# Patient Record
Sex: FEMALE | Race: BLACK OR AFRICAN AMERICAN | NOT HISPANIC OR LATINO | URBAN - METROPOLITAN AREA
[De-identification: names, ages, dates, MRNs, and addresses within clinical notes are randomized per-mention and may not be internally consistent; named-entity substitution may affect disease eponyms.]

---

## 2020-01-22 ENCOUNTER — INPATIENT (INPATIENT)
Facility: HOSPITAL | Age: 40
LOS: 5 days | Discharge: HOME | DRG: 885 | End: 2020-01-28
Attending: PSYCHIATRY & NEUROLOGY | Admitting: PSYCHIATRY & NEUROLOGY
Payer: SELF-PAY

## 2020-01-22 VITALS
HEART RATE: 89 BPM | TEMPERATURE: 98 F | WEIGHT: 130.07 LBS | OXYGEN SATURATION: 100 % | RESPIRATION RATE: 19 BRPM | HEIGHT: 62 IN | DIASTOLIC BLOOD PRESSURE: 71 MMHG | SYSTOLIC BLOOD PRESSURE: 109 MMHG

## 2020-01-22 DIAGNOSIS — Z72.89 OTHER PROBLEMS RELATED TO LIFESTYLE: ICD-10-CM

## 2020-01-22 DIAGNOSIS — F31.9 BIPOLAR DISORDER, UNSPECIFIED: ICD-10-CM

## 2020-01-22 LAB
ALBUMIN SERPL ELPH-MCNC: 3.8 G/DL — SIGNIFICANT CHANGE UP (ref 3.4–5)
ALP SERPL-CCNC: 48 U/L — SIGNIFICANT CHANGE UP (ref 40–120)
ALT FLD-CCNC: 25 U/L — SIGNIFICANT CHANGE UP (ref 12–42)
AMPHET UR-MCNC: NEGATIVE — SIGNIFICANT CHANGE UP
ANION GAP SERPL CALC-SCNC: 3 MMOL/L — LOW (ref 9–16)
AST SERPL-CCNC: 21 U/L — SIGNIFICANT CHANGE UP (ref 15–37)
BARBITURATES UR SCN-MCNC: NEGATIVE — SIGNIFICANT CHANGE UP
BENZODIAZ UR-MCNC: NEGATIVE — SIGNIFICANT CHANGE UP
BILIRUB SERPL-MCNC: 0.3 MG/DL — SIGNIFICANT CHANGE UP (ref 0.2–1.2)
BUN SERPL-MCNC: 13 MG/DL — SIGNIFICANT CHANGE UP (ref 7–23)
CALCIUM SERPL-MCNC: 9.1 MG/DL — SIGNIFICANT CHANGE UP (ref 8.5–10.5)
CHLORIDE SERPL-SCNC: 107 MMOL/L — SIGNIFICANT CHANGE UP (ref 96–108)
CO2 SERPL-SCNC: 33 MMOL/L — HIGH (ref 22–31)
COCAINE METAB.OTHER UR-MCNC: NEGATIVE — SIGNIFICANT CHANGE UP
CREAT SERPL-MCNC: 0.87 MG/DL — SIGNIFICANT CHANGE UP (ref 0.5–1.3)
ETHANOL SERPL-MCNC: 135 MG/DL — HIGH
GLUCOSE SERPL-MCNC: 91 MG/DL — SIGNIFICANT CHANGE UP (ref 70–99)
HCT VFR BLD CALC: 37.9 % — SIGNIFICANT CHANGE UP (ref 34.5–45)
HGB BLD-MCNC: 12.3 G/DL — SIGNIFICANT CHANGE UP (ref 11.5–15.5)
MCHC RBC-ENTMCNC: 24.5 PG — LOW (ref 27–34)
MCHC RBC-ENTMCNC: 32.5 GM/DL — SIGNIFICANT CHANGE UP (ref 32–36)
MCV RBC AUTO: 75.3 FL — LOW (ref 80–100)
METHADONE UR-MCNC: NEGATIVE — SIGNIFICANT CHANGE UP
NRBC # BLD: 0 /100 WBCS — SIGNIFICANT CHANGE UP (ref 0–0)
OPIATES UR-MCNC: NEGATIVE — SIGNIFICANT CHANGE UP
PCP SPEC-MCNC: SIGNIFICANT CHANGE UP
PCP UR-MCNC: NEGATIVE — SIGNIFICANT CHANGE UP
PLATELET # BLD AUTO: 259 K/UL — SIGNIFICANT CHANGE UP (ref 150–400)
POTASSIUM SERPL-MCNC: 4.3 MMOL/L — SIGNIFICANT CHANGE UP (ref 3.5–5.3)
POTASSIUM SERPL-SCNC: 4.3 MMOL/L — SIGNIFICANT CHANGE UP (ref 3.5–5.3)
PROT SERPL-MCNC: 7.4 G/DL — SIGNIFICANT CHANGE UP (ref 6.4–8.2)
RBC # BLD: 5.03 M/UL — SIGNIFICANT CHANGE UP (ref 3.8–5.2)
RBC # FLD: 14.8 % — HIGH (ref 10.3–14.5)
SODIUM SERPL-SCNC: 143 MMOL/L — SIGNIFICANT CHANGE UP (ref 132–145)
THC UR QL: NEGATIVE — SIGNIFICANT CHANGE UP
WBC # BLD: 6.24 K/UL — SIGNIFICANT CHANGE UP (ref 3.8–10.5)
WBC # FLD AUTO: 6.24 K/UL — SIGNIFICANT CHANGE UP (ref 3.8–10.5)

## 2020-01-22 PROCEDURE — 90792 PSYCH DIAG EVAL W/MED SRVCS: CPT | Mod: GT

## 2020-01-22 PROCEDURE — 99285 EMERGENCY DEPT VISIT HI MDM: CPT

## 2020-01-22 PROCEDURE — 93010 ELECTROCARDIOGRAM REPORT: CPT

## 2020-01-22 NOTE — ED BEHAVIORAL HEALTH ASSESSMENT NOTE - DETAILS
reported SI in triage, then retracted, denies past suicide attempt or self harm spoke with nursing staff on [facility] self presented

## 2020-01-22 NOTE — ED BEHAVIORAL HEALTH ASSESSMENT NOTE - SUICIDE RISK FACTORS
Mood Disorder current/past/Hopelessness or despair/Insomnia/Alcohol/Substance abuse disorders/Psychotic disorder current/past/Current mood episode

## 2020-01-22 NOTE — ED BEHAVIORAL HEALTH ASSESSMENT NOTE - OTHER PAST PSYCHIATRIC HISTORY (INCLUDE DETAILS REGARDING ONSET, COURSE OF ILLNESS, INPATIENT/OUTPATIENT TREATMENT)
[facility] hospital [facility] Bipolar I 2015  inpatient [facility] 2017 Bipolar   inpatient [place] [id number]

## 2020-01-22 NOTE — ED BEHAVIORAL HEALTH ASSESSMENT NOTE - ACCOMPANIED BY
Self Lazy S Complex Repair Preamble Text (Leave Blank If You Do Not Want): Extensive wide undermining was performed.

## 2020-01-22 NOTE — ED BEHAVIORAL HEALTH ASSESSMENT NOTE - SUMMARY
39 year old AAF, undomiciled, estranged from family, denies past medical hx, past psych hx of Bipolar I, prior admissions in 2017, not currently taking medications, inconsistent report of suicidal ideation however denies past suicide attempt, admits to alcohol use ( on arrival), denies HI, denies other substance use, presents complains of SI to triage, but then retracts, however presenting with odd affect and hallucinations on exam. Utox neg.    Patient oddly related, history of Bipolar I, inconsistent SI report and without collateral. Patient would benefit from safety and stabilization on inpatient unit. Reporting depressed mood with hopelessness.

## 2020-01-22 NOTE — ED BEHAVIORAL HEALTH ASSESSMENT NOTE - PSYCHIATRIC ISSUES AND PLAN (INCLUDE STANDING AND PRN MEDICATION)
restart psychiatric medication as per primary team; for agitation can give haldol 5mg/ativan 2mg/benadryl 50mg q6hr prn

## 2020-01-22 NOTE — ED PROVIDER NOTE - PROGRESS NOTE DETAILS
pending psych eval. signed out to dr chiang accepted for admission by dr mejia to [facility] psych unit

## 2020-01-22 NOTE — ED ADULT TRIAGE NOTE - CHIEF COMPLAINT QUOTE
pt is poor historian, c/o suicidal thoughts. denies plan or HI. pt reports alcohol use and hx of paranoid schizophrenia.

## 2020-01-22 NOTE — ED BEHAVIORAL HEALTH NOTE - BEHAVIORAL HEALTH NOTE
===================  PRE-HOSPITAL COURSE  ===================  SOURCE:  N/A   DETAILS:  Pt. arrived as walk-in, unaccompanied    ============  ED COURSE   ============  SOURCE:  Primary RN/ Chart  ARRIVAL:  walk-in, unaccompanied   BELONGINGS:  clothing  BEHAVIOR: Pt. cooperative, in fair hygiene, poor eye contact, normal speech, illogical thoughts/ internally preoccupied, observed depressed mood  TREATMENT:  no medications, restraints, security intervention required  VISITORS:  none

## 2020-01-22 NOTE — ED PROVIDER NOTE - OBJECTIVE STATEMENT
38 y/o female with PMHx of depression, bipolar disorder, and schizophrenia presents to ED for psych eval. Patient reports she has been off Seroquel for about 1 year and states she has had some emotional dramas in her life. States she feels depressed and anxious but denies any SI, HI, or auditory or visual hallucinations. Patient also denies any CP, SOB, or abd pain. During HPI, patient exhibits flight of ideas.

## 2020-01-22 NOTE — ED BEHAVIORAL HEALTH ASSESSMENT NOTE - DESCRIPTION
on arrival  Utox neg  did not require PRN medication denies undomiciled, not in contact with family, states she can't hold a job. completed HS

## 2020-01-22 NOTE — ED BEHAVIORAL HEALTH ASSESSMENT NOTE - OTHER
at first states she does not want to talk, then cooperates ,but slightly irritable throughout interview states she thinks there is another person on the camera, unclear if visual hallucination or paranoia external denies past history of violence, denies HI

## 2020-01-22 NOTE — ED PROVIDER NOTE - CLINICAL SUMMARY MEDICAL DECISION MAKING FREE TEXT BOX
38 y/o female presents with depression and anxiety, here for psych consult. Patient is not suicidal or homicidal. Will obtain clearance labs and EKG. Will call psych.

## 2020-01-22 NOTE — ED BEHAVIORAL HEALTH ASSESSMENT NOTE - AXIS IV
Housing problems/Occupational problems/Problems with access to healthcare services/Problems with primary support

## 2020-01-22 NOTE — ED ADULT NURSE NOTE - OBJECTIVE STATEMENT
40 y/o F c/o psych evaluation- pt reports she was recently d/c'd from different psych facility. pt is a poor historian. reports alcohol use today. denies drug use. reports hx of schizophrenia. denies HI but reports she has "moments of suicidal thoughts" denies plan. reports occasional auditory hallucinations.

## 2020-01-22 NOTE — ED BEHAVIORAL HEALTH ASSESSMENT NOTE - RISK ASSESSMENT
elevated risk due to homelessness, poor compliance, hx of chronic mental illness Low Acute Suicide Risk

## 2020-01-23 DIAGNOSIS — F20.9 SCHIZOPHRENIA, UNSPECIFIED: ICD-10-CM

## 2020-01-23 PROCEDURE — 99223 1ST HOSP IP/OBS HIGH 75: CPT

## 2020-01-23 RX ORDER — FOLIC ACID 0.8 MG
1 TABLET ORAL DAILY
Refills: 0 | Status: DISCONTINUED | OUTPATIENT
Start: 2020-01-23 | End: 2020-01-28

## 2020-01-23 RX ORDER — DIPHENHYDRAMINE HCL 50 MG
50 CAPSULE ORAL EVERY 4 HOURS
Refills: 0 | Status: DISCONTINUED | OUTPATIENT
Start: 2020-01-23 | End: 2020-01-28

## 2020-01-23 RX ORDER — TRAZODONE HCL 50 MG
25 TABLET ORAL AT BEDTIME
Refills: 0 | Status: DISCONTINUED | OUTPATIENT
Start: 2020-01-23 | End: 2020-01-28

## 2020-01-23 RX ORDER — HALOPERIDOL DECANOATE 100 MG/ML
5 INJECTION INTRAMUSCULAR EVERY 6 HOURS
Refills: 0 | Status: DISCONTINUED | OUTPATIENT
Start: 2020-01-23 | End: 2020-01-28

## 2020-01-23 RX ORDER — ACETAMINOPHEN 500 MG
650 TABLET ORAL EVERY 6 HOURS
Refills: 0 | Status: DISCONTINUED | OUTPATIENT
Start: 2020-01-23 | End: 2020-01-28

## 2020-01-23 RX ORDER — ARIPIPRAZOLE 15 MG/1
5 TABLET ORAL DAILY
Refills: 0 | Status: DISCONTINUED | OUTPATIENT
Start: 2020-01-24 | End: 2020-01-24

## 2020-01-23 RX ADMIN — Medication 1 TABLET(S): at 13:02

## 2020-01-23 RX ADMIN — Medication 1 MILLIGRAM(S): at 13:02

## 2020-01-23 NOTE — BEHAVIORAL HEALTH ASSESSMENT NOTE - PROBLEM SELECTOR PLAN 1
- start Abilify 5mg  - start benadryl 25mg prn insomnia  - c/w Haldol 5mg q8hr prn agitation  - G/M Therapy  - Discharge planning in progress - start Abilify 5mg  - c/w benadryl 50mg prn insomnia  - c/w Haldol 5mg q8hr prn agitation  - G/M Therapy  - Discharge planning in progress

## 2020-01-23 NOTE — BEHAVIORAL HEALTH ASSESSMENT NOTE - SUICIDE RISK FACTORS
Mood Disorder current/past/Current mood episode/Hopelessness or despair/Insomnia/Alcohol/Substance abuse disorders/Psychotic disorder current/past

## 2020-01-23 NOTE — BEHAVIORAL HEALTH ASSESSMENT NOTE - HPI (INCLUDE ILLNESS QUALITY, SEVERITY, DURATION, TIMING, CONTEXT, MODIFYING FACTORS, ASSOCIATED SIGNS AND SYMPTOMS)
As per admission records: "39 year old AAF, undomiciled, estranged from family, denies past medical hx, past psych hx of Bipolar I, prior admissions in 2017, not currently taking medications, inconsistent report of suicidal ideation however denies past suicide attempt, admits to alcohol use ( on arrival), denies HI, denies other substance use, presents complains of SI to triage, but then retracts, however presenting with odd affect and hallucinations on exam. Utox neg. Patient reports feeling depressed "for a while." States she has no contact with family and lives on the streets. On exam, is oddly related, stating she believes that someone is also on the camera other than writer. States she stopped taking her medications, unable to state why she is not taking them. Admits to drinking alcohol "a komal" with . Denies withdrawal, LOC. Denies other drug use. Patient had reported SI without plan to triage, but when asked about it she says "I'm just tired," but denies plan/intent. Denies past suicide attempt or self harm. States she feels she is not sleeping and feels "manic depression". Reports she has not been admitted to hospital since 2017. Poor historian in regards to her past psych hx. Patient denies AVH or paranoia. Denies access to family/collateral. States "I don't want to speak to them and I don't know where they are." Reports it has been years since last spoke to family. Reports she would like to get help." Pt. reports alcohol and MJ abuse and denies other substance use. She reports to have gone to rehab before for MJ.     Pt. seen, chart reviewed, case discussed with the Interdisciplinary Treatment Team. As per Nursing Report, pt has been isolating to room since arriving on the unit earlier today.  Pt. seen alongside SW, she is lying in her bed, dressed in hospital-provided clothing, and remains lying on her side throughout evaluation.  Pt. is a fairly poor historian and disorganized at times during evaluation, making detailed information gathering difficult.  She makes references to previous psych admission but denies ever having been psychiatrically hospitalized. She reports relocating to [place] from [place] three months ago, [place] [address] [place] She reports to receive SSI, and states she completed 11th grade.  Pt. denies SI/HI/AH/VH. She reports previous good response to Abilify, 25mg, and reprots that she won't take Risperdal because it "has male hormones in it and make me lactate." Pt. also reports she has been sleeping well, and will not take Trazodone because it has "methadone in it."

## 2020-01-23 NOTE — BEHAVIORAL HEALTH ASSESSMENT NOTE - NSBHREFERDETAILS_PSY_A_CORE_FT
ED COURSE   ============  SOURCE:  Primary RN/ Chart  ARRIVAL:  walk-in, unaccompanied   BELONGINGS:  clothing  BEHAVIOR: Pt. cooperative, in fair hygiene, poor eye contact, normal speech, illogical thoughts/ internally preoccupied, observed depressed mood  TREATMENT:  no medications, restraints, security intervention required  VISITORS:  none.

## 2020-01-23 NOTE — BEHAVIORAL HEALTH ASSESSMENT NOTE - RISK ASSESSMENT
Low Acute Suicide Risk elevated risk due to homelessness, poor compliance, hx of chronic mental illness

## 2020-01-23 NOTE — BEHAVIORAL HEALTH ASSESSMENT NOTE - NSBHCHARTREVIEWVS_PSY_A_CORE FT
Vital Signs Last 24 Hrs  T(C): 37.4 (23 Jan 2020 10:09), Max: 37.4 (23 Jan 2020 10:09)  T(F): 99.3 (23 Jan 2020 10:09), Max: 99.3 (23 Jan 2020 10:09)  HR: 76 (23 Jan 2020 10:09) (63 - 76)  BP: 121/83 (23 Jan 2020 10:09) (90/52 - 121/83)  BP(mean): --  RR: 18 (23 Jan 2020 10:09) (16 - 18)  SpO2: 100% (23 Jan 2020 10:09) (98% - 100%)

## 2020-01-24 PROCEDURE — 99233 SBSQ HOSP IP/OBS HIGH 50: CPT

## 2020-01-24 RX ORDER — ARIPIPRAZOLE 15 MG/1
5 TABLET ORAL
Refills: 0 | Status: DISCONTINUED | OUTPATIENT
Start: 2020-01-24 | End: 2020-01-28

## 2020-01-24 RX ADMIN — Medication 1 TABLET(S): at 10:47

## 2020-01-24 RX ADMIN — Medication 1 MILLIGRAM(S): at 10:47

## 2020-01-24 RX ADMIN — ARIPIPRAZOLE 5 MILLIGRAM(S): 15 TABLET ORAL at 10:47

## 2020-01-24 NOTE — PROGRESS NOTE BEHAVIORAL HEALTH - OTHER
superficially cooperative currently denies AH/VH "I'm fine!" Superficially  during interview Focused on discharge Currently denies AH/VH, but appears internally preoccupied

## 2020-01-24 NOTE — PROGRESS NOTE BEHAVIORAL HEALTH - SUMMARY
As per previous records; "39 year old AAF, undomiciled, estranged from family, denies past medical hx, past psych hx of Bipolar I, prior admissions in 2017, not currently taking medications, inconsistent report of suicidal ideation however denies past suicide attempt, admits to alcohol use ( on arrival), denies HI, denies other substance use, presents complains of SI to triage, but then retracts, however presenting with odd affect and hallucinations on exam. Utox neg. Patient oddly related, history of Bipolar I, inconsistent SI report and without collateral. Patient would benefit from safety and stabilization on inpatient unit. Reporting depressed mood with hopelessness."    1/23: Pt. reports previous good response to Abilify, will start at 5mg. Pt. also reports to have taken Haldol in the past, will provide as a prn.  1/24: Pt is compliant with rx, out of her room only for meals and meds. She denies SI/HI, AH/VH, but has labile affect and appears to be internally preoccupied. Pt is discharge focused. As per previous records; "39 year old AAF, undomiciled, estranged from family, denies past medical hx, past psych hx of Bipolar I, prior admissions in 2017, not currently taking medications, inconsistent report of suicidal ideation however denies past suicide attempt, admits to alcohol use ( on arrival), denies HI, denies other substance use, presents complains of SI to triage, but then retracts, however presenting with odd affect and hallucinations on exam. Utox neg. Patient oddly related, history of Bipolar I, inconsistent SI report and without collateral. Patient would benefit from safety and stabilization on inpatient unit. Reporting depressed mood with hopelessness."    1/23: Pt. reports previous good response to Abilify, will start at 5mg. Pt. also reports to have taken Haldol in the past, will provide as a prn.  1/24: Pt is compliant with rx, out of her room only for meals and meds. No prn's needed. She denies SI/HI, AH/VH, but has labile affect and appears to be internally preoccupied. Pt is discharge focused. Will increase Abilify to 5 mg po qAM/qhs, starting tomorrow.

## 2020-01-24 NOTE — PROGRESS NOTE BEHAVIORAL HEALTH - NSBHADMITCOUNSEL_PSY_A_CORE
instructions for management, treatment and follow up/importance of adherence to chosen treatment/client/family/caregiver education/prognosis

## 2020-01-24 NOTE — PROGRESS NOTE BEHAVIORAL HEALTH - NSBHCHARTREVIEWVS_PSY_A_CORE FT
ICU Vital Signs Last 24 Hrs  T(C): 36.3 (24 Jan 2020 09:00), Max: 37.1 (24 Jan 2020 06:00)  T(F): 97.4 (24 Jan 2020 09:00), Max: 98.8 (24 Jan 2020 06:00)  HR: 71 (24 Jan 2020 09:00) (61 - 71)  BP: 122/79 (24 Jan 2020 09:00) (103/69 - 122/79)  BP(mean): --  ABP: --  ABP(mean): --  RR: 18 (24 Jan 2020 09:00) (16 - 18)  SpO2: 99% (24 Jan 2020 09:00) (99% - 99%)

## 2020-01-24 NOTE — PROGRESS NOTE BEHAVIORAL HEALTH - PROBLEM SELECTOR PLAN 1
- Increase Abilify to 5 mg po BID, starting tomorrow.  - c/w benadryl 50mg prn insomnia  - c/w Haldol 5mg q8hr prn agitation  - G/M Therapy  - Discharge planning in progress - Increase Abilify to 5 mg po qAM and qhs, starting tomorrow.  - c/w benadryl 50mg prn insomnia  - c/w Haldol 5mg q8hr prn agitation  - G/M Therapy  - Discharge planning in progress

## 2020-01-24 NOTE — PROGRESS NOTE BEHAVIORAL HEALTH - NSBHFUPINTERVALHXFT_PSY_A_CORE
Pt seen; chart reviewed; discussed with team.  Pt initially observed in her room, but she comes into the hallway to talk with this M.D.  She begins conversation with, "When am I being discharged???" Pt is aware that she was only admitted 2 days ago, yet acknowledges contemplating leaving ASAP. She requested information RE 72-hour letter, though states that at present time, she will hold off on submitting a letter.  Pt denies suicidal or homicidal ideation/intent/plan, also denies auditory/visual hallucinations or PI. Pt seen; chart reviewed; discussed with team.  Pt initially observed in her room, but she comes into the hallway to talk with this M.D.  She begins conversation with, "When am I being discharged???" Pt is aware that she was only admitted 2 days ago, yet acknowledges contemplating leaving ASAP. She requested information RE 72-hour letter, though states that at present time, she will hold off on submitting a letter.  Pt denies suicidal or homicidal ideation/intent/plan, also denies auditory/visual hallucinations or PI.    Per team, pt is oddly related, minimally interactive with staff or peers, coming out of her room for meals and meds only. She is compliant with rx. She denies SI/HI/ideation/intent/plan. Although she also denies AH/VH, she is observed to have labile affect, at times laughing to herself, other times tearful, reflecting likely internal preoccupation. As stated above, she is discharge-focused. Pt seen; chart reviewed; discussed with team.  Pt initially observed in her room, but she comes into the hallway to talk with this M.D.  She begins conversation with, "When am I being discharged???" Pt is aware that she was only admitted 2 days ago, yet acknowledges contemplating leaving ASAP. She requested information RE 72-hour letter, though states that at present time, she will hold off on submitting a letter. Pt encouraged to remain in the hospital until she is better and an appropriate discharge plan can be formulated.  Pt denies suicidal or homicidal ideation/intent/plan, also denies auditory/visual hallucinations or PI. She is tolerating Abilify 5 mg po qhs with no evidence/c/o SE.    Per team, pt is oddly related, minimally interactive with staff or peers, coming out of her room for meals and meds only. She is compliant with rx. She denies SI/HI/ideation/intent/plan. Although she also denies AH/VH, she is observed to have labile affect, at times laughing to herself, other times tearful, reflecting likely internal preoccupation. As stated above, she is discharge-focused. Pt seen; chart reviewed; discussed with team.  Pt initially observed in her room, but she comes into the hallway to talk with this M.D.  She begins conversation with, "When am I being discharged???" Pt is aware that she was only admitted 2 days ago, yet acknowledges contemplating leaving ASAP. She requested information RE 72-hour letter, though states that at present time, she will hold off on submitting a letter. Pt encouraged to remain in the hospital until she is better and an appropriate discharge plan can be formulated.  Pt denies suicidal or homicidal ideation/intent/plan, also denies auditory/visual hallucinations or PI. She is tolerating Abilify 5 mg po qdaily, with no evidence/c/o SE.    Per team, pt is oddly related, minimally interactive with staff or peers, coming out of her room for meals and meds only. She is compliant with rx. She denies SI/HI/ideation/intent/plan. Although she also denies AH/VH, she is observed to have labile affect, at times laughing to herself, other times tearful, reflecting likely internal preoccupation. As stated above, she is discharge-focused.

## 2020-01-25 DIAGNOSIS — Z72.0 TOBACCO USE: ICD-10-CM

## 2020-01-25 PROCEDURE — 99231 SBSQ HOSP IP/OBS SF/LOW 25: CPT

## 2020-01-25 RX ORDER — NICOTINE POLACRILEX 2 MG
2 GUM BUCCAL
Refills: 0 | Status: DISCONTINUED | OUTPATIENT
Start: 2020-01-25 | End: 2020-01-28

## 2020-01-25 RX ORDER — NICOTINE POLACRILEX 2 MG
2 GUM BUCCAL
Refills: 0 | Status: DISCONTINUED | OUTPATIENT
Start: 2020-01-25 | End: 2020-01-25

## 2020-01-25 RX ADMIN — Medication 1 MILLIGRAM(S): at 10:23

## 2020-01-25 RX ADMIN — Medication 1 TABLET(S): at 10:23

## 2020-01-25 RX ADMIN — ARIPIPRAZOLE 5 MILLIGRAM(S): 15 TABLET ORAL at 10:22

## 2020-01-25 RX ADMIN — ARIPIPRAZOLE 5 MILLIGRAM(S): 15 TABLET ORAL at 21:36

## 2020-01-25 NOTE — PROGRESS NOTE BEHAVIORAL HEALTH - PROBLEM SELECTOR PLAN 1
continue Abilify to 5 mg po daily  - c/w benadryl 50mg prn insomnia  - c/w Haldol 5mg q8hr prn agitation  - G/M Therapy  - Discharge planning in progress

## 2020-01-25 NOTE — PROGRESS NOTE BEHAVIORAL HEALTH - NSBHADMITCOUNSEL_PSY_A_CORE
client/family/caregiver education/prognosis/importance of adherence to chosen treatment/instructions for management, treatment and follow up importance of adherence to chosen treatment

## 2020-01-25 NOTE — PROGRESS NOTE BEHAVIORAL HEALTH - SUMMARY
39 year old AAF, undomiciled, estranged from family, denies past medical hx, past psych hx of Bipolar I, prior admissions in 2017, not currently taking medications, inconsistent report of suicidal ideation however denies past suicide attempt, hx of alcohol use ( on arrival), denies HI, denies other substance use, admitted for treatment of depression and SI however presenting odd, disorganized, with inappropriate and labile affect.  Tolerating abilify and not needing prns. Continue plan per primary team. 39 year old AAF, undomiciled, estranged from family, denies past medical hx, past psych hx of Bipolar I, prior admissions in 2017, not currently taking medications, inconsistent report of suicidal ideation however denies past suicide attempt, hx of alcohol use ( on arrival), denies HI, denies other substance use, admitted for treatment of depression and SI however presenting odd, disorganized, with inappropriate and labile affect.  Tolerating Abilify and not needing prns. Continue plan per primary team. Will order nicotine replacement.

## 2020-01-25 NOTE — PROGRESS NOTE BEHAVIORAL HEALTH - NSBHFUPINTERVALHXFT_PSY_A_CORE
Pt seen; chart reviewed; discussed with team.  Pt interviewed in her room. She reports that she thinks she is leaving on Monday. She reports she came to the hospital because she was "overwhelmed..but maybe I'm going to rehab after, can I get a cigarette break".    She is compliant with medications and denies side effects. Pt seen; chart reviewed; discussed with team.  Pt interviewed in her room. She reports that she thinks she is leaving on Monday. She reports she came to the hospital because she was "overwhelmed..but maybe I'm going to rehab after, can I get a cigarette break".  She reports smoking approx 8 cig/day and would like nicotine replacement available for her.   She is compliant with medications and denies side effects.

## 2020-01-26 RX ADMIN — Medication 1 TABLET(S): at 11:00

## 2020-01-26 RX ADMIN — Medication 2 MILLIGRAM(S): at 10:59

## 2020-01-26 RX ADMIN — Medication 1 MILLIGRAM(S): at 11:00

## 2020-01-26 RX ADMIN — ARIPIPRAZOLE 5 MILLIGRAM(S): 15 TABLET ORAL at 10:59

## 2020-01-27 PROCEDURE — 99232 SBSQ HOSP IP/OBS MODERATE 35: CPT

## 2020-01-27 RX ADMIN — ARIPIPRAZOLE 5 MILLIGRAM(S): 15 TABLET ORAL at 23:09

## 2020-01-27 RX ADMIN — Medication 1 TABLET(S): at 09:47

## 2020-01-27 RX ADMIN — ARIPIPRAZOLE 5 MILLIGRAM(S): 15 TABLET ORAL at 09:47

## 2020-01-27 RX ADMIN — Medication 1 MILLIGRAM(S): at 09:47

## 2020-01-27 NOTE — PROGRESS NOTE BEHAVIORAL HEALTH - NSBHADMITDANGERSELF_PSY_A_CORE
unable to care for self/suicidal behavior
suicidal behavior/unable to care for self
suicidal behavior/unable to care for self

## 2020-01-27 NOTE — PROGRESS NOTE BEHAVIORAL HEALTH - PROBLEM SELECTOR PLAN 1
- c/w Abilify 5mg BID  - c/w benadryl 50mg prn insomnia  - c/w Haldol 5mg q8hr prn agitation  - G/M Therapy  - Discharge planning in progress

## 2020-01-27 NOTE — PROGRESS NOTE BEHAVIORAL HEALTH - NSBHFUPINTERVALHXFT_PSY_A_CORE
Pt. seen, chart reviewed, case discussed with the Interdisciplinary Treatment Team. As per Nursing Report, pt has been calm, cooperative, and refused medication once over the weekend.  Pt. seen individually, she is standing in the gu, dressed in hospital-provided clothing. Pt. reports to be feeling “good.” She has been eating and sleeping well.  Pt. denies SI/HI/AH/VH. No side effects to medications reported or observed. Pt. reports she did not feel like getting out of bed once over the weekend as reason she refused her medication.

## 2020-01-27 NOTE — PROGRESS NOTE BEHAVIORAL HEALTH - SUMMARY
As per previous records; "39 year old AAF, undomiciled, estranged from family, denies past medical hx, past psych hx of Bipolar I, prior admissions in 2017, not currently taking medications, inconsistent report of suicidal ideation however denies past suicide attempt, admits to alcohol use ( on arrival), denies HI, denies other substance use, presents complains of SI to triage, but then retracts, however presenting with odd affect and hallucinations on exam. Utox neg. Patient oddly related, history of Bipolar I, inconsistent SI report and without collateral. Patient would benefit from safety and stabilization on inpatient unit. Reporting depressed mood with hopelessness."    1/23: Pt. reports previous good response to Abilify, will start at 5mg. Pt. also reports to have taken Haldol in the past, will provide as a prn.  1/24: Pt is compliant with rx, out of her room only for meals and meds. No prn's needed. She denies SI/HI, AH/VH, but has labile affect and appears to be internally preoccupied. Pt is discharge focused. Will increase Abilify to 5 mg po qAM/qhs, starting tomorrow.  1/27: Pt. with significantly improved relatedness since her first evaluation by this MD last week, appears to be responding well to current medication dose, with some mild level of improvement still to be achieved; will continue medications with no changes today and observe.

## 2020-01-27 NOTE — PROGRESS NOTE BEHAVIORAL HEALTH - NSBHCHARTREVIEWVS_PSY_A_CORE FT
Vital Signs Last 24 Hrs  T(C): 36.9 (27 Jan 2020 09:20), Max: 37.1 (27 Jan 2020 06:00)  T(F): 98.4 (27 Jan 2020 09:20), Max: 98.8 (27 Jan 2020 06:00)  HR: 97 (27 Jan 2020 09:20) (63 - 97)  BP: 105/70 (27 Jan 2020 09:20) (99/64 - 107/63)  BP(mean): --  RR: 20 (27 Jan 2020 09:20) (16 - 20)  SpO2: 93% (27 Jan 2020 09:20) (93% - 98%)

## 2020-01-28 VITALS
OXYGEN SATURATION: 98 % | RESPIRATION RATE: 20 BRPM | DIASTOLIC BLOOD PRESSURE: 67 MMHG | SYSTOLIC BLOOD PRESSURE: 98 MMHG | WEIGHT: 151.9 LBS | HEART RATE: 88 BPM | TEMPERATURE: 99 F

## 2020-01-28 PROCEDURE — 93005 ELECTROCARDIOGRAM TRACING: CPT

## 2020-01-28 PROCEDURE — 99285 EMERGENCY DEPT VISIT HI MDM: CPT | Mod: 25

## 2020-01-28 PROCEDURE — 80053 COMPREHEN METABOLIC PANEL: CPT

## 2020-01-28 PROCEDURE — 99238 HOSP IP/OBS DSCHRG MGMT 30/<: CPT

## 2020-01-28 PROCEDURE — 85027 COMPLETE CBC AUTOMATED: CPT

## 2020-01-28 PROCEDURE — 36415 COLL VENOUS BLD VENIPUNCTURE: CPT

## 2020-01-28 PROCEDURE — 80307 DRUG TEST PRSMV CHEM ANLYZR: CPT

## 2020-01-28 RX ORDER — ARIPIPRAZOLE 15 MG/1
1 TABLET ORAL
Qty: 14 | Refills: 0
Start: 2020-01-28 | End: 2020-02-03

## 2020-01-28 RX ADMIN — ARIPIPRAZOLE 5 MILLIGRAM(S): 15 TABLET ORAL at 09:38

## 2020-01-28 RX ADMIN — Medication 1 MILLIGRAM(S): at 09:38

## 2020-01-28 RX ADMIN — Medication 2 MILLIGRAM(S): at 09:38

## 2020-01-28 RX ADMIN — Medication 1 TABLET(S): at 09:38

## 2020-01-28 NOTE — PROGRESS NOTE BEHAVIORAL HEALTH - NSBHFUPINTERVALHXFT_PSY_A_CORE
Pt. seen, chart reviewed, case discussed with the Interdisciplinary Treatment Team. As per Nursing Report, pt has been calm, cooperative, and compliant with medications.  Pt. seen individually, she is sitting in the gu, dressed in hospital-provided clothing. Pt. reports to be feeling “good.” She has been eating and sleeping well.  Pt. denies SI/HI/AH/VH. No side effects to medications reported or observed. Pt. is looking forward to discharge this afternoon and following up with her Outpatient providers.

## 2020-01-28 NOTE — PROGRESS NOTE BEHAVIORAL HEALTH - NSBHADMITIPREASON_PSY_A_CORE
other reason
Danger to self; mental illness expected to respond to inpatient care

## 2020-01-28 NOTE — PROGRESS NOTE BEHAVIORAL HEALTH - NSBHCHARTREVIEWVS_PSY_A_CORE FT
Vital Signs Last 24 Hrs  T(C): 37.2 (28 Jan 2020 09:27), Max: 37.2 (28 Jan 2020 06:00)  T(F): 99 (28 Jan 2020 09:27), Max: 99 (28 Jan 2020 09:27)  HR: 88 (28 Jan 2020 09:27) (69 - 88)  BP: 98/67 (28 Jan 2020 09:27) (98/67 - 117/87)  BP(mean): --  RR: 20 (28 Jan 2020 09:27) (18 - 20)  SpO2: 98% (28 Jan 2020 09:27) (98% - 100%)

## 2020-01-28 NOTE — PROGRESS NOTE BEHAVIORAL HEALTH - NSBHADMITMEDEDUDETAILS_A_CORE FT
Risks, benefits, and potential side effects of all medications discussed with patient.

## 2020-01-28 NOTE — PROGRESS NOTE BEHAVIORAL HEALTH - SUMMARY
As per previous records; "39 year old AAF, undomiciled, estranged from family, denies past medical hx, past psych hx of Bipolar I, prior admissions in 2017, not currently taking medications, inconsistent report of suicidal ideation however denies past suicide attempt, admits to alcohol use ( on arrival), denies HI, denies other substance use, presents complains of SI to triage, but then retracts, however presenting with odd affect and hallucinations on exam. Utox neg. Patient oddly related, history of Bipolar I, inconsistent SI report and without collateral. Patient would benefit from safety and stabilization on inpatient unit. Reporting depressed mood with hopelessness."    1/23: Pt. reports previous good response to Abilify, will start at 5mg. Pt. also reports to have taken Haldol in the past, will provide as a prn.  1/24: Pt is compliant with rx, out of her room only for meals and meds. No prn's needed. She denies SI/HI, AH/VH, but has labile affect and appears to be internally preoccupied. Pt is discharge focused. Will increase Abilify to 5 mg po qAM/qhs, starting tomorrow.  1/27: Pt. with significantly improved relatedness since her first evaluation by this MD last week, appears to be responding well to current medication dose, with some mild level of improvement still to be achieved; will continue medications with no changes today and observe.  1/28: Good response to current medications. Pt. may be safely discharged to the community to follow up with Outpatient providers.

## 2020-01-28 NOTE — PROGRESS NOTE BEHAVIORAL HEALTH - RISK ASSESSMENT
elevated risk due to homelessness, poor compliance, hx of chronic mental illness

## 2020-02-02 DIAGNOSIS — Z59.0 HOMELESSNESS: ICD-10-CM

## 2020-02-02 DIAGNOSIS — F31.9 BIPOLAR DISORDER, UNSPECIFIED: ICD-10-CM

## 2020-02-02 DIAGNOSIS — R45.851 SUICIDAL IDEATIONS: ICD-10-CM

## 2020-02-02 DIAGNOSIS — F20.9 SCHIZOPHRENIA, UNSPECIFIED: ICD-10-CM

## 2020-02-02 DIAGNOSIS — F17.210 NICOTINE DEPENDENCE, CIGARETTES, UNCOMPLICATED: ICD-10-CM

## 2020-02-02 DIAGNOSIS — F10.10 ALCOHOL ABUSE, UNCOMPLICATED: ICD-10-CM

## 2020-02-02 DIAGNOSIS — Z72.89 OTHER PROBLEMS RELATED TO LIFESTYLE: ICD-10-CM
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
## 2020-02-02 SDOH — ECONOMIC STABILITY - HOUSING INSECURITY: HOMELESSNESS: Z59.0

## 2020-04-02 NOTE — BEHAVIORAL HEALTH ASSESSMENT NOTE - ACCESS TO FIREARM
35 yo female, 3 days ago with headache, taking Tylenol, last night diarrhea X 3, body aches, fever 100.7 - 4 am today - last time she took Tylenol was at 12 noon.  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 lives with mother (65 yo), daughter (10 yo) asymptomatic  VS: 117/83, HR 78, Temp 99.5F, O2 sat 99% at rest and 98% ambulating No

## 2023-09-11 ENCOUNTER — EMERGENCY (EMERGENCY)
Facility: HOSPITAL | Age: 43
LOS: 1 days | Discharge: HOME | End: 2023-09-11
Attending: EMERGENCY MEDICINE | Admitting: EMERGENCY MEDICINE
Payer: COMMERCIAL

## 2023-09-11 VITALS
RESPIRATION RATE: 18 BRPM | OXYGEN SATURATION: 100 % | SYSTOLIC BLOOD PRESSURE: 148 MMHG | TEMPERATURE: 98 F | HEART RATE: 93 BPM | DIASTOLIC BLOOD PRESSURE: 89 MMHG | HEIGHT: 62 IN | WEIGHT: 130.07 LBS

## 2023-09-11 DIAGNOSIS — F20.9 SCHIZOPHRENIA, UNSPECIFIED: ICD-10-CM

## 2023-09-11 DIAGNOSIS — F14.90 COCAINE USE, UNSPECIFIED, UNCOMPLICATED: ICD-10-CM

## 2023-09-11 DIAGNOSIS — F31.9 BIPOLAR DISORDER, UNSPECIFIED: ICD-10-CM

## 2023-09-11 DIAGNOSIS — Z20.822 CONTACT WITH AND (SUSPECTED) EXPOSURE TO COVID-19: ICD-10-CM

## 2023-09-11 DIAGNOSIS — Z04.6 ENCOUNTER FOR GENERAL PSYCHIATRIC EXAMINATION, REQUESTED BY AUTHORITY: ICD-10-CM

## 2023-09-11 PROBLEM — F32.9 MAJOR DEPRESSIVE DISORDER, SINGLE EPISODE, UNSPECIFIED: Chronic | Status: ACTIVE | Noted: 2020-01-22

## 2023-09-11 LAB
ALBUMIN SERPL ELPH-MCNC: 3.8 G/DL — SIGNIFICANT CHANGE UP (ref 3.3–5)
ALP SERPL-CCNC: 64 U/L — SIGNIFICANT CHANGE UP (ref 40–120)
ALT FLD-CCNC: 13 U/L — SIGNIFICANT CHANGE UP (ref 10–45)
AMPHET UR-MCNC: NEGATIVE — SIGNIFICANT CHANGE UP
ANION GAP SERPL CALC-SCNC: 7 MMOL/L — SIGNIFICANT CHANGE UP (ref 5–17)
APPEARANCE UR: CLEAR — SIGNIFICANT CHANGE UP
AST SERPL-CCNC: 17 U/L — SIGNIFICANT CHANGE UP (ref 10–40)
BACTERIA # UR AUTO: PRESENT /HPF
BARBITURATES UR SCN-MCNC: NEGATIVE — SIGNIFICANT CHANGE UP
BASOPHILS # BLD AUTO: 0.04 K/UL — SIGNIFICANT CHANGE UP (ref 0–0.2)
BASOPHILS NFR BLD AUTO: 0.7 % — SIGNIFICANT CHANGE UP (ref 0–2)
BENZODIAZ UR-MCNC: NEGATIVE — SIGNIFICANT CHANGE UP
BILIRUB SERPL-MCNC: 0.3 MG/DL — SIGNIFICANT CHANGE UP (ref 0.2–1.2)
BILIRUB UR-MCNC: NEGATIVE — SIGNIFICANT CHANGE UP
BUN SERPL-MCNC: 19 MG/DL — SIGNIFICANT CHANGE UP (ref 7–23)
CALCIUM SERPL-MCNC: 8.6 MG/DL — SIGNIFICANT CHANGE UP (ref 8.4–10.5)
CHLORIDE SERPL-SCNC: 106 MMOL/L — SIGNIFICANT CHANGE UP (ref 96–108)
CO2 SERPL-SCNC: 28 MMOL/L — SIGNIFICANT CHANGE UP (ref 22–31)
COCAINE METAB.OTHER UR-MCNC: POSITIVE
COLOR SPEC: YELLOW — SIGNIFICANT CHANGE UP
COMMENT - URINE: SIGNIFICANT CHANGE UP
CREAT SERPL-MCNC: 0.8 MG/DL — SIGNIFICANT CHANGE UP (ref 0.5–1.3)
DIFF PNL FLD: ABNORMAL
EGFR: 93 ML/MIN/1.73M2 — SIGNIFICANT CHANGE UP
EOSINOPHIL # BLD AUTO: 0.28 K/UL — SIGNIFICANT CHANGE UP (ref 0–0.5)
EOSINOPHIL NFR BLD AUTO: 4.9 % — SIGNIFICANT CHANGE UP (ref 0–6)
EPI CELLS # UR: ABNORMAL /HPF (ref 0–5)
ETHANOL SERPL-MCNC: <10 MG/DL — SIGNIFICANT CHANGE UP (ref 0–10)
GLUCOSE SERPL-MCNC: 57 MG/DL — LOW (ref 70–99)
GLUCOSE UR QL: NEGATIVE — SIGNIFICANT CHANGE UP
HCT VFR BLD CALC: 38.7 % — SIGNIFICANT CHANGE UP (ref 34.5–45)
HGB BLD-MCNC: 12.3 G/DL — SIGNIFICANT CHANGE UP (ref 11.5–15.5)
IMM GRANULOCYTES NFR BLD AUTO: 0.3 % — SIGNIFICANT CHANGE UP (ref 0–0.9)
KETONES UR-MCNC: NEGATIVE — SIGNIFICANT CHANGE UP
LEUKOCYTE ESTERASE UR-ACNC: NEGATIVE — SIGNIFICANT CHANGE UP
LYMPHOCYTES # BLD AUTO: 1.82 K/UL — SIGNIFICANT CHANGE UP (ref 1–3.3)
LYMPHOCYTES # BLD AUTO: 31.7 % — SIGNIFICANT CHANGE UP (ref 13–44)
MCHC RBC-ENTMCNC: 24.4 PG — LOW (ref 27–34)
MCHC RBC-ENTMCNC: 31.8 GM/DL — LOW (ref 32–36)
MCV RBC AUTO: 76.6 FL — LOW (ref 80–100)
METHADONE UR-MCNC: NEGATIVE — SIGNIFICANT CHANGE UP
MONOCYTES # BLD AUTO: 0.66 K/UL — SIGNIFICANT CHANGE UP (ref 0–0.9)
MONOCYTES NFR BLD AUTO: 11.5 % — SIGNIFICANT CHANGE UP (ref 2–14)
NEUTROPHILS # BLD AUTO: 2.93 K/UL — SIGNIFICANT CHANGE UP (ref 1.8–7.4)
NEUTROPHILS NFR BLD AUTO: 50.9 % — SIGNIFICANT CHANGE UP (ref 43–77)
NITRITE UR-MCNC: NEGATIVE — SIGNIFICANT CHANGE UP
NRBC # BLD: 0 /100 WBCS — SIGNIFICANT CHANGE UP (ref 0–0)
OPIATES UR-MCNC: NEGATIVE — SIGNIFICANT CHANGE UP
PCP SPEC-MCNC: SIGNIFICANT CHANGE UP
PCP UR-MCNC: NEGATIVE — SIGNIFICANT CHANGE UP
PH UR: 6 — SIGNIFICANT CHANGE UP (ref 5–8)
PLATELET # BLD AUTO: 293 K/UL — SIGNIFICANT CHANGE UP (ref 150–400)
POTASSIUM SERPL-MCNC: 3.2 MMOL/L — LOW (ref 3.5–5.3)
POTASSIUM SERPL-SCNC: 3.2 MMOL/L — LOW (ref 3.5–5.3)
PROT SERPL-MCNC: 7.1 G/DL — SIGNIFICANT CHANGE UP (ref 6–8.3)
PROT UR-MCNC: NEGATIVE MG/DL — SIGNIFICANT CHANGE UP
RBC # BLD: 5.05 M/UL — SIGNIFICANT CHANGE UP (ref 3.8–5.2)
RBC # FLD: 14.9 % — HIGH (ref 10.3–14.5)
RBC CASTS # UR COMP ASSIST: < 5 /HPF — SIGNIFICANT CHANGE UP
SARS-COV-2 RNA SPEC QL NAA+PROBE: SIGNIFICANT CHANGE UP
SODIUM SERPL-SCNC: 141 MMOL/L — SIGNIFICANT CHANGE UP (ref 135–145)
SP GR SPEC: 1.02 — SIGNIFICANT CHANGE UP (ref 1–1.03)
THC UR QL: NEGATIVE — SIGNIFICANT CHANGE UP
UROBILINOGEN FLD QL: 1 E.U./DL — SIGNIFICANT CHANGE UP
WBC # BLD: 5.75 K/UL — SIGNIFICANT CHANGE UP (ref 3.8–10.5)
WBC # FLD AUTO: 5.75 K/UL — SIGNIFICANT CHANGE UP (ref 3.8–10.5)
WBC UR QL: ABNORMAL /HPF

## 2023-09-11 PROCEDURE — 87635 SARS-COV-2 COVID-19 AMP PRB: CPT

## 2023-09-11 PROCEDURE — 93005 ELECTROCARDIOGRAM TRACING: CPT

## 2023-09-11 PROCEDURE — 36415 COLL VENOUS BLD VENIPUNCTURE: CPT

## 2023-09-11 PROCEDURE — 90792 PSYCH DIAG EVAL W/MED SRVCS: CPT

## 2023-09-11 PROCEDURE — 82962 GLUCOSE BLOOD TEST: CPT

## 2023-09-11 PROCEDURE — 80307 DRUG TEST PRSMV CHEM ANLYZR: CPT

## 2023-09-11 PROCEDURE — 93010 ELECTROCARDIOGRAM REPORT: CPT

## 2023-09-11 PROCEDURE — 99284 EMERGENCY DEPT VISIT MOD MDM: CPT

## 2023-09-11 PROCEDURE — 99285 EMERGENCY DEPT VISIT HI MDM: CPT

## 2023-09-11 PROCEDURE — 85025 COMPLETE CBC W/AUTO DIFF WBC: CPT

## 2023-09-11 PROCEDURE — 81001 URINALYSIS AUTO W/SCOPE: CPT

## 2023-09-11 PROCEDURE — 80053 COMPREHEN METABOLIC PANEL: CPT

## 2023-09-11 NOTE — ED BEHAVIORAL HEALTH ASSESSMENT NOTE - DESCRIPTION
presented to ED, placed on 1:1, psychaitry consulted denied 43yo female, unclear if single, reports staying with a friend, however, records indicate that she is homeless and unemployed. presented to ED, placed on 1:1, psychiatry consulted

## 2023-09-11 NOTE — ED BEHAVIORAL HEALTH ASSESSMENT NOTE - OTHER PAST PSYCHIATRIC HISTORY (INCLUDE DETAILS REGARDING ONSET, COURSE OF ILLNESS, INPATIENT/OUTPATIENT TREATMENT)
previously admitted to ipp here at [facility], at that time it appeared patient was with "odd affect and hallucinations on exam. Utox neg" Hospitalizations: previously admitted to ipp here at [facility] in 2020, at that time it appeared patient was with "odd affect and hallucinations on exam. Utox neg". Per yusuf, last psychiatric hospitalization was at [place] in January 2023 for Unspecified Psychosis. Since then, multiple ED psychiatric visits to [place] and [place] for bipolar disorder, schizoaffective disorder, and/or cocaine abuse.  Most recent meds: per yusuf, was prescribed haldol dec 50mg monthly last received in May 2023. Last picked up abilify 10mg daily and depakote 500mg TID in October 2022.

## 2023-09-11 NOTE — ED PROVIDER NOTE - OBJECTIVE STATEMENT
41 y/o F, PMHx of bipolar disorder, currently not on meds, last hospitalization in 2020, has been to [place] psych unit, now presenting to the ED stating she does not feel right and wants to hurt someone. Pt does not want to hurt herself. Pt is not making a lot of sense during presentation. Pt has flight of ideas. she denies SI. Pt uses cocaine, but has not used in a while. She does not drink ETOH, but smokes cigarettes.

## 2023-09-11 NOTE — ED BEHAVIORAL HEALTH ASSESSMENT NOTE - PAST PSYCHOTROPIC MEDICATION
previously on abilify 5mg, psyckees revealed previous haldol dec injection See above. Previously on abilify 5mg, psyckes revealed previous haldol dec injection.

## 2023-09-11 NOTE — ED PROVIDER NOTE - CLINICAL SUMMARY MEDICAL DECISION MAKING FREE TEXT BOX
41 y/o F, PMHx of bipolar disorder, currently not on meds, asking to speak to psychiatry. She states she wants to hurt someone, but not herself. Pt placed on 1 to 1 observation and consult w/ psych. 41 y/o F, PMHx of bipolar disorder, currently not on meds, asking to speak to psychiatry. She states she wants to hurt someone, but not herself. Pt placed on 1 to 1 observation and consult w/ psych.    As per psych, pt can be discharged - no acute intervention at this time.

## 2023-09-11 NOTE — ED BEHAVIORAL HEALTH ASSESSMENT NOTE - NSBHATTESTCOMMENTATTENDFT_PSY_A_CORE
43 yo F with hx of schizophrenia vs bipolar, unknown if currently on meds, last hospitalized in Jan 2023 at [place], Utox +cocaine, presenting to ED seeking psychiatric admission. Pt appeared irritable on exam and engaged minimally, but denied SI/HI/AVH and did not appear acutely manic, psychotic, or depressed. After time resting in ED she desires discharge and expresses little interest in inpatient mental health treatment at this time. Pt is cleared for discharge at this time; does not meet criteria for psychiatric hospitalization; her acute risk of harm to self or others is not elevated.

## 2023-09-11 NOTE — ED ADULT NURSE NOTE - OBJECTIVE STATEMENT
Pt is a 43 y/o female A&O ambulatory with steady gait requesting psych admission for untreated Bipolar disorder. Pt denies SI/HI, hallucinations, alcohol/drug use today. Pt's appearance noted to be disheveled with garbled and tangential speech.

## 2023-09-11 NOTE — ED PROVIDER NOTE - NSFOLLOWUPINSTRUCTIONS_ED_ALL_ED_FT
Follow up with [facility] as outpatient: [address] [phone].    Managing Bipolar Disorder  If you have been diagnosed with bipolar disorder, you may be relieved to now know that this is why you have felt or behaved a certain way. You may also feel overwhelmed about the treatment ahead, how to get needed support, and how to deal with your condition each day. With care and support, you can learn to manage your symptoms and live with your condition.    How to manage lifestyle changes  Managing stress    A person sitting on the floor doing yoga.  Stress is your body's reaction to life changes and events, both good and bad. Stress can affect your condition, so it is important to learn how to manage stress. Some techniques to help manage stress include:  Meditation, muscle relaxation, and breathing exercises.  Exercise. Even a short daily walk can help to lower stress levels.  Getting enough good-quality sleep. Too little sleep can trigger the emotional highs of octavio.  Making a schedule to manage your time. A daily schedule can help keep you from feeling overwhelmed by tasks and deadlines.  Spending time on hobbies you enjoy.  Medicines    Your health care provider may suggest certain medicines if he or she thinks that these will help improve your condition. Avoid using caffeine, alcohol, and other substances that may prevent your medicines from working properly. Be sure to:  Talk with your pharmacist or health care provider about all the medicines that you take, their possible side effects, and which medicines are safe to take together.  Make it your goal to take part in all treatment decisions with your health care provider (shared decision-making). Ask about possible side effects of medicines, and share how you feel about having those side effects. It is best if shared decision-making is part of your total treatment plan.  If you are taking medicines as part of your treatment, do not stop taking them before asking if it is safe to stop. You may need to have the medicine slowly decreased (tapered) over time to lower the risk of harmful side effects.    Relationships    Two people talking with a counselor.  Spend time with people whom you trust and with whom you feel a sense of understanding and calm. Try to find friends or family members who make you feel safe and can help you control feelings of octavio. Consider going to couples counseling, family education classes, or family therapy to:  Teach your loved ones about your condition and suggest ways they can support you.  Help resolve conflicts.  Help develop communication skills in your relationships.  How to recognize changes in your condition  Everyone responds differently to treatment for bipolar disorder. Signs that your condition is improving include:  Leveling of your mood. You may have less anger and excitement about daily activities, and your low moods may not be as bad.  Your symptoms being less intense.  Feeling calm more often.  Thinking clearly.  Not experiencing consequences for extreme behavior.  Feeling like your life is settling down.  Your behavior seeming more normal to you and to other people.  Signs that your condition may be getting worse include:  Sleep problems.  Moods cycling between deep lows and unusually high energy.  Extreme emotions, or more anger toward loved ones.  Staying away from others, or isolating yourself.  A feeling of power or superiority.  Completing a lot of tasks in a very short amount of time.  Unusual thoughts and behaviors.  Thoughts of suicide.  Follow these instructions at home:  Medicines    Take over-the-counter and prescription medicines only as told by your health care provider or pharmacist.  Ask your pharmacist what over-the-counter cold medicines you should avoid. Some medicines can make symptoms worse.  General instructions    Ask for support from trusted family members or friends to make sure you stay on track with your treatment.  Keep a journal to write down your daily moods, medicines, sleep habits, and life events. This may bring you more success with your treatment.  Make and follow a routine for daily meal times. Eat healthy foods, such as whole grains, vegetables, and fresh fruit.  Try to go to sleep and wake up around the same time every day.  Avoid using products that contain nicotine or tobacco. If you want help quitting, ask your health care provider.  Keep all follow-up visits. This is important.  Where to find support  Helplines and other support    Substance Abuse and Mental Health Services Administration (SAMA) National Helpline: 8-531-273-HELP (0364)  National [place] on Mental Illness (LEVI) HelpLine: 5-172-483-[place]VI (7460) or email helpline@levi.org  Depression and Bipolar Support [place]: dbsalliance.org  Talking to others    Try making a list of the people you may want to tell about your condition, such as the people you trust most.  Plan what you are willing and not willing to talk about. Think about your needs ahead of time and how others can support you.  Let your loved ones know when they can share advice and when you would just like them to listen.  Give your loved ones information about bipolar disorder and encourage them to learn about the condition.  Finances    Not all insurance plans provide the same coverage for mental health care, so it is important to check with your insurance carrier. If paying for co-pays or counseling services is a problem, search for a local or [facility] mental health care center. Public mental health care services may be offered there at a low cost or no cost when you are not able to see a private health care provider.    If you are taking medicine for depression, you may be able to get the generic form, which may cost less than brand-name medicine. Some makers of prescription medicines also offer help to people who cannot afford the medicines they need.    Questions to ask your health care provider:  If you are taking medicines    How long do I need to take medicine?  Are there any long-term side effects of my medicine?  Are there any alternatives to taking medicine?  Other questions    How would I benefit from therapy?  How often should I follow up with a health care provider?  Contact a health care provider if:  Your symptoms get worse or they do not get better with treatment.  Get help right away if:  You have thoughts about harming yourself or others.  If you ever feel like you may hurt yourself or others, or have thoughts about taking your own life, get help right away. Go to your nearest emergency department or:  Call your local emergency services (360 in the [place]  Call a suicide crisis helpline, such as the National Suicide Prevention Lifeline at 1-683.571.6803 or 647 in the [place] This is open 24 hours a day in the [place]  Text the Crisis Text Line at 470309 (in the [place]  Summary  Stress can affect your condition, so it is important to learn how to manage stress.  Aim to take part in all treatment decisions (shared decision-making).  Teach your loved ones about your condition and suggest ways they can support you.  Contact a health care provider if your symptoms get worse or they do not get better with treatment.  This information is not intended to replace advice given to you by your health care provider. Make sure you discuss any questions you have with your health care provider.    Document Revised: 07/14/2022 Document Reviewed: 06/09/2022

## 2023-09-11 NOTE — ED ADULT TRIAGE NOTE - TEMPERATURE IN CELSIUS (DEGREES C)
KNEE PROGRESS NOTE:   Referring Physician: Dr. Cherylene League  Diagnosis: primary OA of right knee   Date of Service: 6/19/2018     PATIENT SUMMARY   Saundra Henderson is a 45year old y/o female who presents to therapy today with complaints of Right knee pain  Pt desc continue PT to reach functional goals. Precautions:  None  OBJECTIVE:   Gait: Pt ambulates without AD and antalgic gait. Observation/Skin: Incision healed without sings of infection.   Girth at inferior patella right: 13.5in   Left: 13.5 Superior knee weight shifts with 10 deg knee flexion, hands on table 2sx10 Heel raises 20x Heel raises 20x Heel raises 20x Neuro  Heel raises 20x Neuro  Heel raises 20x     Manual  Quad STM  Patellar mobs grade 3 inf and superior  TF post and ant glide grade 1-2 Manual Therapeutic Activity; Gait Training; Pt education; Home exercise program instructions;     Education or treatment limitation: None  Rehab Potential:good    Patient/Family/Caregiver was advised of these findings, precautions, and treatment options and has a 36.7

## 2023-09-11 NOTE — ED BEHAVIORAL HEALTH ASSESSMENT NOTE - VIOLENCE RISK FACTORS:
Irritability Substance abuse/Community stressors that increase the risk of destabilization/Irritability

## 2023-09-11 NOTE — ED BEHAVIORAL HEALTH ASSESSMENT NOTE - HPI (INCLUDE ILLNESS QUALITY, SEVERITY, DURATION, TIMING, CONTEXT, MODIFYING FACTORS, ASSOCIATED SIGNS AND SYMPTOMS)
Patient is a 41yo female, unclear if single, reports staying with a friend, however, records indicate that she is homeless and unemployed. Patient reports pph of depression, medical records indicate previous hx of bipolar I disorder and Psyckes indicates numerous Dx including bipolar disorders, primary psychotic disorders, substance use disorder. Patient is a 41yo female, unclear if single, reports staying with a friend, however, records indicate that she is homeless and unemployed. Patient reports pph of depression, medical records indicate previous hx of bipolar I disorder and Psyckes indicates numerous Dx including bipolar disorders, primary psychotic disorders, and substance use disorders. She denied pmh. Patient was brought in by self for psychiatric evaluation. Psychiatry was consulted for the same.     On initial encounter patient was laying in chair, falling asleep and mumbling her words. She appeared unkept and very tired. Patient was unable to engage and therefore, attempted to see patient again after the patient rested. On re-encounter, the patient was awake, alert but Patient is a 41yo female, unclear if single, reports staying with a friend, however, records indicate that she is homeless and unemployed. Patient reports pph of depression, medical records indicate previous hx of bipolar I disorder and Psyckes indicates numerous Dx including bipolar disorders, primary psychotic disorders, and substance use disorders. She denied pmh. Patient was brought in by self for psychiatric evaluation. Psychiatry was consulted for the same.     On initial encounter patient was laying in chair, falling asleep and mumbling her words. She appeared unkept and very tired. Patient was unable to engage and therefore, attempted to see patient again after the patient rested. On re-encounter, the patient was awake, alert but dismissive to undersigned and irritable to ED staff. Patient was chewing while eating, was annoyed that we were asking her "the same questions everyone else has asked". She reports that she wanted to go upstairs "so I can get stabilized and take some medications to help me calm down. I don't know, maybe I don't even need meds". She was asked if she thought she had been more irritable lately to which she responded "yeah, now that you're asking me so many questions". At this point patient got up, left and went to the bathroom without prior discussion of the same. Patient was asked about her situation but patient reported she was fine, stated she was sleeping well, eating well, eating well, denied si/hi and stated "I wanna go upstairs". Patient became more irritated and asked team to leave.     Patient was later seen by attending and reported to attending that she just wanted to leave and go home and reported that everything was fine. She refused to meaningfully engage with attending.     She was linear in thought, did not appear internally preoccupied or paranoid. She did not have pressured speech and did not present as overtly grandiose.     Review of psyc revealed that patient is a high utilizer of ED services, has been admitted to inaptient psychaitric units before and has not followed up with outpatient psychaitric care in >1 year.    Cocaine Metabolite, Urine (09.11.23 @ 09:32)    Cocaine Metabolite, Urine: Positive Patient is a 43yo female, unclear if single, reports staying with a friend, however, records indicate that she is homeless and unemployed. Patient reports pph of depression, medical records indicate previous hx of bipolar I disorder and Psyckes indicates numerous Dx including bipolar disorders, primary psychotic disorders, and substance use disorders. She denied pmh. Patient was brought in by self for psychiatric evaluation. Psychiatry was consulted for the same.     Of note, pt was previously hospitalized on [facility] in 2020. Presented to ED endorsing SI; had ; subsequently retracted SI but presented with odd affect and hallucinations; utox negative. Pt started on abilify 5mg BID; referred to [place] dual diagnosis program on discharge. Leading diagnosis was schizophrenia.    In ED, work-up notable for +cocaine on Utox.     Today, on initial encounter patient was laying in chair, falling asleep and mumbling her words. She appeared unkept and very tired. Patient was unable to engage and therefore, attempted to see patient again after the patient rested. On re-encounter, the patient was awake, alert but dismissive to undersigned and irritable to ED staff. Patient was chewing while eating, was annoyed that we were asking her "the same questions everyone else has asked". She reports that she wanted to go upstairs "so I can get stabilized and take some medications to help me calm down. I don't know, maybe I don't even need meds". She was asked if she thought she had been more irritable lately to which she responded "yeah, now that you're asking me so many questions". At this point patient got up, left and went to the bathroom without prior discussion of the same. Patient was asked about her situation but patient reported she was fine, stated she was sleeping well, eating well, eating well, denied si/hi and stated "I wanna go upstairs". Patient became more irritated and asked team to leave.     Patient was later seen by attending and reported to attending that she just wanted to leave and go home and reported that everything was fine. She refused to meaningfully engage with attending.     She was linear in thought, did not appear internally preoccupied or paranoid. She did not have pressured speech and did not present as overtly grandiose.     [facility] is a high utilizer of ED services, has been admitted to inaptient psychiatric units before and has not followed up with outpatient psychaitric care in >1 year.    Cocaine Metabolite, Urine (09.11.23 @ 09:32)    Cocaine Metabolite, Urine: Positive

## 2023-09-11 NOTE — ED BEHAVIORAL HEALTH ASSESSMENT NOTE - SUMMARY
Patient is a 43yo female, unclear if single, reports staying with a friend, however, records indicate that she is homeless and unemployed. Patient reports pph of depression, medical records indicate previous hx of bipolar I disorder and Psyckes indicates numerous Dx including bipolar disorders, primary psychotic disorders, substance use disorder. Patient brought in by self for evaluation. Psychaitry consulted for the same.     On encounter patient noted to be dismissive of undersigned, reluctantly engaging in interview, however, she was in fair behavioral control and did not become aggressive. Patient reported she wished to go upstairs for treatment but could not identify what she would like to address. She denied depressive or anxious symptoms, denied si/hi. She did not appear internally preoccupied and was goal oriented in thought. Of note, patient's UTOX was positive for cocaine metabolite. At this time, the patient does not meet criteria for psychiatric hospitalization.    Recommendations:  - no inpatient psychiatric care. Patient is a 41yo female, unclear if single, reports staying with a friend, however, records indicate that she is homeless and unemployed. Patient reports pph of depression, medical records indicate previous hx of bipolar I disorder and Psyckes indicates numerous Dx including bipolar disorders, primary psychotic disorders, substance use disorder. Patient brought in by self for evaluation. Psychiatry consulted for the same.     On encounter patient noted to be dismissive of undersigned, reluctantly engaging in interview, however, she was in fair behavioral control and did not become aggressive. Patient reported she wished to go upstairs for treatment but could not identify what she would like to address. She denied depressive or anxious symptoms, denied si/hi. She did not appear internally preoccupied and was goal oriented in thought. Patient did not appear manic or psychotic. Of note, patient's UTOX was positive for cocaine metabolite. At this time, the patient does not meet criteria for psychiatric hospitalization.    Recommendations:  - no inpatient psychiatric admission  - Patient should follow up with outpatient psychiatric care  Patient was previously referred to [facility]  [address] [place] [address]  P: [phone] Patient is a 41yo female, unclear if single, reports staying with a friend, however, records indicate that she is homeless and unemployed. Patient reports pph of depression, medical records indicate previous hx of bipolar I disorder and Psyckes indicates numerous Dx including bipolar disorders, primary psychotic disorders, substance use disorder. Patient brought in by self for evaluation. Psychiatry consulted for the same.     On encounter patient noted to be dismissive of undersigned, reluctantly engaging in interview, however, she was in fair behavioral control and did not become aggressive. Patient reported she wished to go upstairs for treatment but could not identify what she would like to address. She denied depressive or anxious symptoms, denied si/hi. She did not appear internally preoccupied and was goal oriented in thought. Patient did not appear manic or psychotic. Of note, patient's UTOX was positive for cocaine metabolite. At this time, the patient does not meet criteria for psychiatric hospitalization. On reassessment, she asked for discharge.    Recommendations:  - no inpatient psychiatric admission  - Patient should follow up with outpatient psychiatric care  Patient was previously referred to [facility]  [address] [place] [address]  P: [phone]

## 2023-09-11 NOTE — ED BEHAVIORAL HEALTH ASSESSMENT NOTE - REFERRAL / APPOINTMENT DETAILS
[facility] [address] [place] [address] P: [phone]

## 2023-09-11 NOTE — ED PROVIDER NOTE - PHYSICAL EXAMINATION
VITAL SIGNS: I have reviewed nursing notes and confirm.  CONSTITUTIONAL: Disheveled appearance, in no acute distress.  SKIN: Agree with RN documentation regarding decubitus evaluation. Remainder of skin exam is warm and dry, no acute rash.  HEAD: Normocephalic; atraumatic.  EYES: PERRL, EOM intact; conjunctiva and sclera clear.  ENT: No nasal discharge; airway clear.  NECK: Supple; non tender.  CARD: S1, S2 normal; no murmurs, gallops, or rubs. Regular rate and rhythm.  RESP: No wheezes, rales or rhonchi.  ABD: Normal bowel sounds; soft; non-distended; non-tender; no hepatosplenomegaly.  EXT: Normal ROM. No clubbing, cyanosis or edema.  NEURO: Alert, oriented. Grossly unremarkable.  PSYCH: Cooperative, appropriate. Pt shows flight of ideas and disorganized thinking.

## 2023-09-11 NOTE — ED PROVIDER NOTE - PATIENT PORTAL LINK FT
You can access the FollowMyHealth Patient Portal offered by [facility] by registering at the following website: [email or web address]. By joining [employer]’s FollowMyHealth portal, you will also be able to view your health information using other applications (apps) compatible with our system.

## 2023-09-11 NOTE — ED BEHAVIORAL HEALTH ASSESSMENT NOTE - OTHER
records indicate undomiciled, patient reported "staying with a friend" unable to assess records indicate unemployed deferred fair impulsivity control
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
## 2024-02-15 NOTE — ED PROVIDER NOTE - SKIN NEGATIVE STATEMENT, MLM
Detail Level: Generalized
Detail Level: Simple
no abrasions, no jaundice, no lesions, no pruritis, and no rashes.

## 2025-06-02 ENCOUNTER — EMERGENCY (EMERGENCY)
Facility: HOSPITAL | Age: 45
LOS: 1 days | End: 2025-06-02
Attending: EMERGENCY MEDICINE | Admitting: EMERGENCY MEDICINE
Payer: MEDICAID

## 2025-06-02 VITALS
RESPIRATION RATE: 16 BRPM | OXYGEN SATURATION: 100 % | DIASTOLIC BLOOD PRESSURE: 70 MMHG | SYSTOLIC BLOOD PRESSURE: 108 MMHG | HEART RATE: 113 BPM | TEMPERATURE: 99 F | HEIGHT: 64 IN | WEIGHT: 117.95 LBS

## 2025-06-02 PROCEDURE — 70450 CT HEAD/BRAIN W/O DYE: CPT | Mod: 26

## 2025-06-02 PROCEDURE — 99284 EMERGENCY DEPT VISIT MOD MDM: CPT
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
## 2025-06-03 ENCOUNTER — EMERGENCY (EMERGENCY)
Facility: HOSPITAL | Age: 45
LOS: 1 days | End: 2025-06-03
Admitting: EMERGENCY MEDICINE

## 2025-06-03 PROCEDURE — L9992: CPT

## 2025-06-03 NOTE — ED PROVIDER NOTE - PATIENT PORTAL LINK FT
You can access the FollowMyHealth Patient Portal offered by [facility] by registering at the following website: [email or web address]. By joining [employer]’s FollowMyHealth portal, you will also be able to view your health information using other applications (apps) compatible with our system.
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
## 2025-06-03 NOTE — ED PROVIDER NOTE - ATTENDING APP SHARED VISIT CONTRIBUTION OF CARE
Patient with minor head injury, no neuro deficits on exam. CT head negative.  Return precautions discussed with patient.
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
## 2025-06-04 DIAGNOSIS — F20.9 SCHIZOPHRENIA, UNSPECIFIED: ICD-10-CM

## 2025-06-04 DIAGNOSIS — S09.90XA UNSPECIFIED INJURY OF HEAD, INITIAL ENCOUNTER: ICD-10-CM
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
## 2025-06-06 DIAGNOSIS — Z53.21 PROCEDURE AND TREATMENT NOT CARRIED OUT DUE TO PATIENT LEAVING PRIOR TO BEING SEEN BY HEALTH CARE PROVIDER: ICD-10-CM

## 2025-06-06 DIAGNOSIS — R44.0 AUDITORY HALLUCINATIONS: ICD-10-CM
